# Patient Record
Sex: MALE | Race: WHITE
[De-identification: names, ages, dates, MRNs, and addresses within clinical notes are randomized per-mention and may not be internally consistent; named-entity substitution may affect disease eponyms.]

---

## 2017-02-25 ENCOUNTER — HOSPITAL ENCOUNTER (EMERGENCY)
Dept: HOSPITAL 25 - UCEAST | Age: 37
Discharge: HOME | End: 2017-02-25
Payer: SELF-PAY

## 2017-02-25 VITALS — SYSTOLIC BLOOD PRESSURE: 106 MMHG | DIASTOLIC BLOOD PRESSURE: 66 MMHG

## 2017-02-25 DIAGNOSIS — F17.290: ICD-10-CM

## 2017-02-25 DIAGNOSIS — H61.23: Primary | ICD-10-CM

## 2017-02-25 PROCEDURE — G0463 HOSPITAL OUTPT CLINIC VISIT: HCPCS

## 2017-02-25 PROCEDURE — 99213 OFFICE O/P EST LOW 20 MIN: CPT

## 2017-02-25 NOTE — UC
Ear Complaint HPI





- History of Current Complaint


Chief Complaint: UCEar


Stated Complaint: EAR COMPLAINT


Time Seen by Provider: 02/25/17 13:30


Hx Obtained From: Patient


Onset/Duration: Gradual Onset - has had decreased hearing and ringing in bilat 

ears over past few days, no pain, no drainage


Severity Initially: Mild


Severity Currently: Mild - has had chronic earwax build -up. has them flushed 

approx q month


Aggravating Factors: Nothing


Alleviating Factors: Nothing


Associated Signs/Symptoms: Positive: Hearing Loss.  Negative: URI Symptoms


Related History: Other (Noted In Comments) - cerumen impactions





- Allergies/Home Medications


Allergies/Adverse Reactions: 


 Allergies











Allergy/AdvReac Type Severity Reaction Status Date / Time


 


No Known Allergies Allergy   Verified 04/13/16 17:28











Home Medications: 


 Home Medications





NK [No Home Medications Reported]  02/25/17 [History Confirmed 02/25/17]











PMH/Surg Hx/FS Hx/Imm Hx


Previously Healthy: Yes


Cardiovascular History Of: 


   Denies: Cardiac Disorders


Respiratory History Of: 


   Denies: COPD





- Surgical History


Surgical History: Yes


Surgery Procedure, Year, and Place: WISDOM TEETH





- Family History


Known Family History: Positive: None


   Negative: Blood Disorder





- Social History


Occupation: Employed Full-time


Lives: With Family


Alcohol Use: None


Substance Use Type: Marijuana


Substance Use Comment - Amount & Last Used: daily


Smoking Status (MU): Current Some Day Smoker


Type: Cigarettes


Amount Used/How Often: 1 CIG/DAY


Cessation Counseling: Patient Advised to Stop





Review of Systems


Constitutional: Negative


Skin: Negative


ENT: Other - wax build up


Respiratory: Negative


Cardiovascular: Negative


Neurovascular: Negative


Neurological: Negative


Psychological: Negative


All Other Systems Reviewed And Are Negative: Yes





Physical Exam


Triage Information Reviewed: Yes


Appearance: Well-Appearing, No Pain Distress, Well-Nourished


Vital Signs: 


 Initial Vital Signs











Temp  98.7 F   02/25/17 13:14


 


Pulse  52   02/25/17 13:14


 


Resp  18   02/25/17 13:14


 


BP  106/66   02/25/17 13:14


 


Pulse Ox  98   02/25/17 13:14











Vital Signs Reviewed: Yes


Eyes: Positive: Conjunctiva Clear


ENT: Positive: Pharynx normal, Other: - bilateral cerumen impactions occluding 

TMs.  Negative: Nasal congestion


Respiratory Exam: Normal


Respiratory: Positive: Lungs clear


Cardiovascular Exam: Normal


Cardiovascular: Positive: RRR


Neurological Exam: Normal


Psychological Exam: Normal


Skin Exam: Normal





Re-Evaluation





- Re-Evaluation


  ** First Eval


Re-Evaluation Time: 14:10 - bilateral ear canals are clear, TM's pearly with 

normal landmarks. pt states ears feel better, hearing improved


Change: Improved





Ear Complaint Course/Dx





- Differential Dx/Diagnosis


Differential Diagnosis/HQI/PQRI: Cerumen Impaction, Otitis Externa, Otitis Media


Provider Diagnoses: bilateral cerumen impaction





Discharge





- Discharge Plan


Condition: Good


Disposition: HOME


Patient Education Materials:  Cerumen Impaction (ED)


Additional Instructions: 


use the over the counter ear wax oil you have at home as directed





return for problems

## 2017-08-04 ENCOUNTER — HOSPITAL ENCOUNTER (EMERGENCY)
Dept: HOSPITAL 25 - ED | Age: 37
Discharge: HOME | End: 2017-08-04
Payer: SELF-PAY

## 2017-08-04 VITALS — DIASTOLIC BLOOD PRESSURE: 60 MMHG | SYSTOLIC BLOOD PRESSURE: 108 MMHG

## 2017-08-04 DIAGNOSIS — Z72.0: ICD-10-CM
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
## 2017-08-04 DIAGNOSIS — F12.90: ICD-10-CM

## 2017-08-04 DIAGNOSIS — S99.912A: Primary | ICD-10-CM
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
## 2017-08-04 PROCEDURE — 99281 EMR DPT VST MAYX REQ PHY/QHP: CPT

## 2017-08-04 NOTE — RAD
Indication: Left ankle pain.



3 views of left ankle demonstrates no fracture or dislocation. No other bone or joint

abnormality is identified. Soft tissue swelling is noted.



IMPRESSION: No fracture of the left ankle is noted.

## 2017-08-04 NOTE — ED
Lower Extremity





- HPI Summary


HPI Summary: 


37M presents with left ankle pain two days ago after invert his ankle.  He has 

been walking on his toes since.  He states that area swelled and he noticed 

some brusing to the area.  He denies any numbness or tingling. He has been 

taking advil for his pain. He has sprain his ankle multiple times and feels 

like it feels the same. 








- History of Current Complaint


Chief Complaint: EDExtremityLower


Stated Complaint: FALL/LT ANKLE-FOOT PAIN


Time Seen by Provider: 08/04/17 16:04


Pain Intensity: 5





- Allergies/Home Medications


Allergies/Adverse Reactions: 


 Allergies











Allergy/AdvReac Type Severity Reaction Status Date / Time


 


No Known Allergies Allergy   Verified 08/04/17 13:53














PMH/Surg Hx/FS Hx/Imm Hx


Respiratory History: 


   Denies: Hx Chronic Obstructive Pulmonary Disease (COPD)


Psychiatric History: Reports: Hx Substance Abuse - HX OF HEROIN INJECTION IN 20' S





- Surgical History


Surgery Procedure, Year, and Place: WISDOM TEETH


Infectious Disease History: Reports: Hx Hepatitis - c


   Denies: Hx Clostridium Difficile, Hx Human Immunodeficiency Virus (HIV), Hx 

of Known/Suspected MRSA, Hx Shingles, Hx Tuberculosis, Hx Known/Suspected VRE, 

Hx Known/Suspected VRSA, History Other Infectious Disease, Traveled Outside the 

[place] in Last 30 Days





- Family History


Known Family History: Positive: None


   Negative: Cardiac Disease, Blood Disorder





- Social History


Alcohol Use: None


Substance Use Type: Reports: Marijuana


Substance Use Comment - Amount & Last Used: daily


Smoking Status (MU): Current Some Day Smoker


Type: Cigarettes


Amount Used/How Often: 1 CIG/DAY





Review of Systems


Negative: Fever


Negative: Chest Pain


Negative: Shortness Of Breath


Positive: Myalgia - left ankle


All Other Systems Reviewed And Are Negative: Yes





Physical Exam


Triage Information Reviewed: Yes


Vital Signs On Initial Exam: 


 Initial Vitals











Temp Pulse Resp BP Pulse Ox


 


 98.7 F   66   16   121/62   97 


 


 08/04/17 13:53  08/04/17 13:53  08/04/17 13:53  08/04/17 13:53  08/04/17 13:53











Vital Signs Reviewed: Yes


Appearance: Positive: Well-Appearing


Skin: Positive: Warm, Dry


Head/Face: Positive: Normal Head/Face Inspection


Eyes: Positive: Normal, Conjunctiva Clear


Respiratory/Lung Sounds: Positive: Clear to Auscultation, Breath Sounds Present


Cardiovascular: Positive: Normal, RRR


Musculoskeletal: Positive: Limited @ - left ankle, Edema Left - ankle, Other - 

good pulses, capillary refill<2 secs, ecchymosis to ankle and foot, tender 

across lateral aspect of ankle





Diagnostics





- Vital Signs


 Vital Signs











  Temp Pulse Resp BP Pulse Ox


 


 08/04/17 15:38  97.9 F  89   108/60  98


 


 08/04/17 13:53  98.7 F  66  16  121/62  97














- Laboratory


Lab Statement: Any lab studies that have been ordered have been reviewed, and 

results considered in the medical decision making process.





Lower Extremity Course/Dx





- Course


Course Of Treatment: 37M presents with left ankle pain two days ago after 

invert his ankle.  He has been walking on his toes since.  He states that area 

swelled and he noticed some brusing to the area.  He denies any numbness or 

tingling. He has been taking advil for his pain. He has sprain his ankle 

multiple times and feels like it feels the same. xray normal. will treat with 

RICE. patient understands and agrees with plan.





- Diagnoses


Differential Diagnosis/HQI/PQRI: Positive: Fracture (Closed), Sprain, Strain


Provider Diagnoses: 


 Left ankle injury








Discharge





- Discharge Plan


Condition: Good


Disposition: HOME


Patient Education Materials:  Ankle Sprain (ED)


Referrals: 


[facility] [Outside]


Additional Instructions: 


Stay off ankle as much as possible


Ice, elevate, keep in ACE


Ibuprofen every 6-8 hours for pain


Establish care with primary care physician


Return to ED if develop any new or worsening symptoms

## 2018-07-21 ENCOUNTER — HOSPITAL ENCOUNTER (EMERGENCY)
Dept: HOSPITAL 25 - ED | Age: 38
Discharge: HOME | End: 2018-07-21
Payer: SELF-PAY

## 2018-07-21 VITALS — SYSTOLIC BLOOD PRESSURE: 101 MMHG | DIASTOLIC BLOOD PRESSURE: 67 MMHG

## 2018-07-21 DIAGNOSIS — Z72.0: ICD-10-CM

## 2018-07-21 DIAGNOSIS — F19.90: ICD-10-CM

## 2018-07-21 DIAGNOSIS — F10.921: Primary | ICD-10-CM

## 2018-07-21 PROCEDURE — 99284 EMERGENCY DEPT VISIT MOD MDM: CPT

## 2018-09-20 ENCOUNTER — HOSPITAL ENCOUNTER (EMERGENCY)
Dept: HOSPITAL 25 - ED | Age: 38
LOS: 1 days | Discharge: HOME | End: 2018-09-21
Payer: SELF-PAY

## 2018-09-20 DIAGNOSIS — R45.851: Primary | ICD-10-CM

## 2018-09-20 DIAGNOSIS — Z72.0: ICD-10-CM

## 2018-09-20 LAB
BASOPHILS # BLD AUTO: 0.1 10^3/UL (ref 0–0.2)
EOSINOPHIL # BLD AUTO: 0.2 10^3/UL (ref 0–0.6)
HCT VFR BLD AUTO: 43 % (ref 42–52)
HGB BLD-MCNC: 14.9 G/DL (ref 14–18)
LYMPHOCYTES # BLD AUTO: 1.6 10^3/UL (ref 1–4.8)
MCH RBC QN AUTO: 32 PG (ref 27–31)
MCHC RBC AUTO-ENTMCNC: 34 G/DL (ref 31–36)
MCV RBC AUTO: 92 FL (ref 80–94)
MONOCYTES # BLD AUTO: 0.7 10^3/UL (ref 0–0.8)
NEUTROPHILS # BLD AUTO: 8.1 10^3/UL (ref 1.5–7.7)
NRBC # BLD AUTO: 0 10^3/UL
NRBC BLD QL AUTO: 0
PLATELET # BLD AUTO: 251 10^3/UL (ref 150–450)
RBC # BLD AUTO: 4.69 10^6/UL (ref 4–5.4)
WBC # BLD AUTO: 10.7 10^3/UL (ref 3.5–10.8)

## 2018-09-20 PROCEDURE — 99285 EMERGENCY DEPT VISIT HI MDM: CPT

## 2018-09-20 PROCEDURE — 86618 LYME DISEASE ANTIBODY: CPT

## 2018-09-20 PROCEDURE — G0480 DRUG TEST DEF 1-7 CLASSES: HCPCS

## 2018-09-20 PROCEDURE — 85025 COMPLETE CBC W/AUTO DIFF WBC: CPT

## 2018-09-20 PROCEDURE — 81003 URINALYSIS AUTO W/O SCOPE: CPT

## 2018-09-20 PROCEDURE — 36415 COLL VENOUS BLD VENIPUNCTURE: CPT

## 2018-09-20 PROCEDURE — 84443 ASSAY THYROID STIM HORMONE: CPT

## 2018-09-20 PROCEDURE — 80320 DRUG SCREEN QUANTALCOHOLS: CPT

## 2018-09-20 PROCEDURE — 80053 COMPREHEN METABOLIC PANEL: CPT

## 2018-09-20 PROCEDURE — 80307 DRUG TEST PRSMV CHEM ANLYZR: CPT

## 2018-09-20 PROCEDURE — 80329 ANALGESICS NON-OPIOID 1 OR 2: CPT

## 2018-09-20 NOTE — ED
Psychiatric Complaint





- HPI Summary


HPI Summary: 


This patient is a 38 year old M presenting to [facility] with a chief complaint 

of SI since 9/19/18. He endorses several stressors: my parents will be 

homeless in a week/are addicted to heroin, his friend hanged himself last week

, he overdosed on acid 2 months ago, and his wife left him. Pt endorses 

marijuana use. Patient denies SI, and claims that he is in the ED because 

someone who is trying to fuck my wife said that the patient claimed Id 

rather die than never see my family again. He endorses a previous suicide 

attempt when 13 (25 years ago). Per police report, pt was brought in for active 

SI.





- History Of Current Complaint


Chief Complaint: EDMentalHealth


Hx Obtained From: Patient


Onset/Duration: Sudden Onset, Lasting Hours, Resolved - allegedly


Severity Initially: Moderate


Severity Currently: Mild


Character: Frustrated


Aggravating Factor(s): Recent Stress - see HPI summary


Alleviating Factor(s): Nothing


Associated Signs And Symptoms: Positive: Negative


Related History: Positive For: Prior Psychiatric Issues


Has Suicidal: Reports: Thoughts - resolved, Has Prior Attempt(s) - 25 years ago


Has Homicidal: Denies: Thoughts





- Allergies/Home Medications


Allergies/Adverse Reactions: 


 Allergies











Allergy/AdvReac Type Severity Reaction Status Date / Time


 


No Known Allergies Allergy   Verified 08/04/17 13:53














PMH/Surg Hx/FS Hx/Imm Hx


Endocrine/Hematology History: 


   Denies: Hx Sickle Cell Disease


Cardiovascular History: 


   Denies: Hx Pacemaker/ICD


Respiratory History: 


   Denies: Hx Chronic Obstructive Pulmonary Disease (COPD)


GI History: 


   Denies: Hx Ileostomy


 History: 


   Denies: Hx Dialysis


Sensory History: 


   Denies: Hx Legally Blind, Hx Deafness


Opthamlomology History: 


   Denies: Hx Legally Blind


EENT History: 


   Denies: Hx Deafness


Neurological History: 


   Denies: Hx Dementia


Psychiatric History: Reports: Hx Suicide Attempt, Hx Substance Abuse - HX OF 

HEROIN INJECTION IN 20'S





- Surgical History


Surgery Procedure, Year, and Place: WISDOM TEETH


Infectious Disease History: Yes


Infectious Disease History: Reports: Hx Hepatitis - c


   Denies: Hx Clostridium Difficile, Hx Human Immunodeficiency Virus (HIV), Hx 

of Known/Suspected MRSA, Hx Shingles, Hx Tuberculosis, Hx Known/Suspected VRE, 

Hx Known/Suspected VRSA, History Other Infectious Disease, Traveled Outside the 

[place] in Last 30 Days





- Family History


Known Family History: 


   Negative: Cardiac Disease, Blood Disorder





- Social History


Lives: Alone


Alcohol Use: None


Hx Substance Use: Yes


Substance Use Type: Reports: Marijuana, Synthetic Drugs


Substance Use Comment - Amount & Last Used: daily


Hx Tobacco Use: Yes


Smoking Status (MU): Current Some Day Smoker


Type: Cigarettes


Amount Used/How Often: 1 CIG/DAY





Review of Systems


Negative: Fever


Positive: no symptoms reported


Positive: Other - resolved SI.


All Other Systems Reviewed And Are Negative: Yes





Physical Exam





- Summary


Physical Exam Summary: 


VITAL SIGNS: Reviewed.


GENERAL: Patient is a well-developed and nourished (MALE OR FEMALE) who is 

lying comfortable in the stretcher. Patient is not in any acute respiratory 

distress.


HEAD AND FACE: No signs of trauma. No ecchymosis, hematomas or skull 

depressions. No sinus tenderness.


EYES: PERRLA, EOMI x 2, No injected conjunctiva, no nystagmus.


EARS: Hearing grossly intact. Ear canals and tympanic membranes are within 

normal limits.


MOUTH: Oropharynx within normal limits.


NECK: Supple, trachea is midline, no adenopathy, no JVD, no carotid bruit, no c-

spine tenderness, neck with full ROM.


CHEST: Symmetric, no tenderness at palpation


LUNGS: Clear to auscultation bilaterally. No wheezing or crackles.


CVS: Regular rate and rhythm, S1 and S2 present, no murmurs or gallops 

appreciated.


ABDOMEN: Soft, non-tender. No signs of distention. No rebound no guarding, and 

no masses palpated. Bowel sounds are normal.


EXTREMITIES: FROM in all major joints, no edema, no cyanosis or clubbing.


NEURO: Alert and oriented x 3. No acute neurological deficits. Speech is normal 

and follows commands.


SKIN: Dry and warm


PSYCH: Elaine


Triage Information Reviewed: Yes


Vital Signs On Initial Exam: 


 Initial Vitals











Temp Pulse Resp BP Pulse Ox


 


 98.8 F   91   16   149/110   98 


 


 09/20/18 15:48  09/20/18 15:48  09/20/18 15:48  09/20/18 15:48  09/20/18 15:48











Vital Signs Reviewed: Yes





Diagnostics





- Vital Signs


 Vital Signs











  Temp Pulse Resp BP Pulse Ox


 


 09/20/18 15:48  98.8 F  91  16  149/110  98














- Laboratory


Lab Results: 


 Lab Results











  09/20/18 09/20/18 09/20/18 Range/Units





  16:13 16:13 16:19 


 


WBC    10.7  (3.5-10.8)  10^3/ul


 


RBC    4.69  (4.00-5.40)  10^6/ul


 


Hgb    14.9  (14.0-18.0)  g/dl


 


Hct    43  (42-52)  %


 


MCV    92  (80-94)  fL


 


MCH    32 H  (27-31)  pg


 


MCHC    34  (31-36)  g/dl


 


RDW    14  (10.5-15)  %


 


Plt Count    251  (150-450)  10^3/ul


 


MPV    9.0  (7.4-10.4)  um3


 


Neut % (Auto)    75.5  (38-83)  %


 


Lymph % (Auto)    15.3 L  (25-47)  %


 


Mono % (Auto)    6.7  (0-7)  %


 


Eos % (Auto)    1.9  (0-6)  %


 


Baso % (Auto)    0.6  (0-2)  %


 


Absolute Neuts (auto)    8.1 H  (1.5-7.7)  10^3/ul


 


Absolute Lymphs (auto)    1.6  (1.0-4.8)  10^3/ul


 


Absolute Monos (auto)    0.7  (0-0.8)  10^3/ul


 


Absolute Eos (auto)    0.2  (0-0.6)  10^3/ul


 


Absolute Basos (auto)    0.1  (0-0.2)  10^3/ul


 


Absolute Nucleated RBC    0  10^3/ul


 


Nucleated RBC %    0  


 


Sodium     (135-145)  mmol/L


 


Potassium     (3.5-5.0)  mmol/L


 


Chloride     (101-111)  mmol/L


 


Carbon Dioxide     (22-32)  mmol/L


 


Anion Gap     (2-11)  mmol/L


 


BUN     (6-24)  mg/dL


 


Creatinine     (0.67-1.17)  mg/dL


 


Est GFR ( Amer)     (>60)  


 


Est GFR (Non-Af Amer)     (>60)  


 


BUN/Creatinine Ratio     (8-20)  


 


Glucose     ()  mg/dL


 


Calcium     (8.6-10.3)  mg/dL


 


Total Bilirubin     (0.2-1.0)  mg/dL


 


AST     (13-39)  U/L


 


ALT     (7-52)  U/L


 


Alkaline Phosphatase     ()  U/L


 


Total Protein     (6.4-8.9)  g/dL


 


Albumin     (3.2-5.2)  g/dL


 


Globulin     (2-4)  g/dL


 


Albumin/Globulin Ratio     (1-3)  


 


TSH     (0.34-5.60)  mcIU/mL


 


Urine Color  Yellow    


 


Urine Appearance  Cloudy    


 


Urine pH  7.0    (5-9)  


 


Ur Specific Gravity  1.014    (1.010-1.030)  


 


Urine Protein  Negative    (Negative)  


 


Urine Ketones  Negative    (Negative)  


 


Urine Blood  Negative    (Negative)  


 


Urine Nitrate  Negative    (Negative)  


 


Urine Bilirubin  Negative    (Negative)  


 


Urine Urobilinogen  Negative    (Negative)  


 


Ur Leukocyte Esterase  Negative    (Negative)  


 


Urine Glucose  Negative    (Negative)  


 


Salicylates     (<30)  mg/dL


 


Urine Opiates Screen   None detected   (None Detect)  


 


Acetaminophen     mcg/mL


 


Ur Barbiturates Screen   None detected   (None Detect)  


 


Ur Phencyclidine Scrn   None detected   (None Detect)  


 


Ur Amphetamines Screen   None detected   (None Detect)  


 


U Benzodiazepines Scrn   None detected   (None Detect)  


 


Urine Cocaine Screen   None detected   (None Detect)  


 


U Cannabinoids Screen   Presumptive positive A   (None Detect)  


 


Serum Alcohol     (<10)  mg/dL














  09/20/18 Range/Units





  16:19 


 


WBC   (3.5-10.8)  10^3/ul


 


RBC   (4.00-5.40)  10^6/ul


 


Hgb   (14.0-18.0)  g/dl


 


Hct   (42-52)  %


 


MCV   (80-94)  fL


 


MCH   (27-31)  pg


 


MCHC   (31-36)  g/dl


 


RDW   (10.5-15)  %


 


Plt Count   (150-450)  10^3/ul


 


MPV   (7.4-10.4)  um3


 


Neut % (Auto)   (38-83)  %


 


Lymph % (Auto)   (25-47)  %


 


Mono % (Auto)   (0-7)  %


 


Eos % (Auto)   (0-6)  %


 


Baso % (Auto)   (0-2)  %


 


Absolute Neuts (auto)   (1.5-7.7)  10^3/ul


 


Absolute Lymphs (auto)   (1.0-4.8)  10^3/ul


 


Absolute Monos (auto)   (0-0.8)  10^3/ul


 


Absolute Eos (auto)   (0-0.6)  10^3/ul


 


Absolute Basos (auto)   (0-0.2)  10^3/ul


 


Absolute Nucleated RBC   10^3/ul


 


Nucleated RBC %   


 


Sodium  138  (135-145)  mmol/L


 


Potassium  4.0  (3.5-5.0)  mmol/L


 


Chloride  107  (101-111)  mmol/L


 


Carbon Dioxide  25  (22-32)  mmol/L


 


Anion Gap  6  (2-11)  mmol/L


 


BUN  16  (6-24)  mg/dL


 


Creatinine  0.88  (0.67-1.17)  mg/dL


 


Est GFR ( Amer)  116.7  (>60)  


 


Est GFR (Non-Af Amer)  96.4  (>60)  


 


BUN/Creatinine Ratio  18.2  (8-20)  


 


Glucose  109 H  ()  mg/dL


 


Calcium  9.4  (8.6-10.3)  mg/dL


 


Total Bilirubin  0.70  (0.2-1.0)  mg/dL


 


AST  19  (13-39)  U/L


 


ALT  20  (7-52)  U/L


 


Alkaline Phosphatase  60  ()  U/L


 


Total Protein  7.3  (6.4-8.9)  g/dL


 


Albumin  4.7  (3.2-5.2)  g/dL


 


Globulin  2.6  (2-4)  g/dL


 


Albumin/Globulin Ratio  1.8  (1-3)  


 


TSH  1.71  (0.34-5.60)  mcIU/mL


 


Urine Color   


 


Urine Appearance   


 


Urine pH   (5-9)  


 


Ur Specific Gravity   (1.010-1.030)  


 


Urine Protein   (Negative)  


 


Urine Ketones   (Negative)  


 


Urine Blood   (Negative)  


 


Urine Nitrate   (Negative)  


 


Urine Bilirubin   (Negative)  


 


Urine Urobilinogen   (Negative)  


 


Ur Leukocyte Esterase   (Negative)  


 


Urine Glucose   (Negative)  


 


Salicylates  < 2.50  (<30)  mg/dL


 


Urine Opiates Screen   (None Detect)  


 


Acetaminophen  < 15  mcg/mL


 


Ur Barbiturates Screen   (None Detect)  


 


Ur Phencyclidine Scrn   (None Detect)  


 


Ur Amphetamines Screen   (None Detect)  


 


U Benzodiazepines Scrn   (None Detect)  


 


Urine Cocaine Screen   (None Detect)  


 


U Cannabinoids Screen   (None Detect)  


 


Serum Alcohol  < 10  (<10)  mg/dL











Result Diagrams: 


 09/20/18 16:19





 09/20/18 16:19


Lab Statement: Any lab studies that have been ordered have been reviewed, and 

results considered in the medical decision making process.





Course/Dx





- Course


Course Of Treatment: This patient is a 38 year old M presenting to [facility] 

with a chief complaint of alleged SI since 9/19/18. He endorses several 

stressors: my parents will be homeless in a week/are addicted to heroin, his 

friend hanged himself last week, he overdosed on acid 2 months ago, and his 

wife left him. Pt endorses marijuana use. Patient denies SI, and claims that he 

is in the ED because someone who is trying to fuck my wife said that the 

patient claimed Id rather die than never see my family again. He endorses a 

previous suicide attempt when 13 (25 years ago).  In the ED course, the patient 

was given a nicotine inhaler.  The patient's labs are normal except for low MCH

, low lymph %, high abs neut, high glucose, and presumptive (+) cannabinoids.





- Differential Dx/Clinical Impression


Provider Diagnosis: 


 Suicidal ideation








Discharge





- Sign-Out/Discharge


Documenting (check all that apply): Sign-Out Patient


Signing out patient TO: Cheng Carter [facility]





- Discharge Plan


Condition: Good


Disposition: HOME


Referrals: 


No Primary Care Phys,NOPCP [Primary Care Provider] - 





- Billing Disposition and Condition


Condition: GOOD


Disposition: Home





- Attestation Statements


Document Initiated by Mathieu: Yes


Documenting Scribe: Pratik Camejo


Provider For Whom Mathieu is Documenting (Include Credential): Dr. Moshe Romero MD


Scribe Attestation: 


Pratik CASTILLO scribed for Dr. Moshe Romero MD on 09/21/18 at 0513. 


Scribe Documentation Reviewed: Yes


Provider Attestation: 


The documentation as recorded by the Pratik young accurately 

reflects the service I personally performed and the decisions made by me, Dr. Moshe Romero MD

## 2018-09-21 VITALS — DIASTOLIC BLOOD PRESSURE: 63 MMHG | SYSTOLIC BLOOD PRESSURE: 96 MMHG

## 2018-09-21 NOTE — ED
Progress





- Progress Note


Progress Note: 





This pt was signed out by Dr. Romero, pending disposition, awaiting MHE.





Pt had a mental health evaluation and his case was reviewed by Dr. Brown, 

psychiatrist. Pt will be discharged home. 





Course/Dx





- Diagnoses


Provider Diagnoses: 


 Suicidal ideation








Discharge





- Sign-Out/Discharge


Documenting (check all that apply): Patient Departure - Discharge, Receiving 

Sign-Out


Signing out patient TO: Cheng Carter


Receiving patient FROM: Moshe Romero





- Discharge Plan


Condition: Good


Disposition: HOME


Patient Education Materials:  Depression (ED), Anxiety (ED), Suicide Prevention 

(ED)


Referrals: 


[facility] [Other] (Please follow up with your 

regular therapist, Jannette Rod, at your earliest convenience.)


No Primary Care Phys,NOPCP [Primary Care Provider] - 





- Attestation Statements


Document Initiated by Scribe: Yes


Documenting Scribe: Kiera Sevilla


Provider For Whom Scribe is Documenting (Include Credential): Cheng Carter MD


Scribe Attestation: 


I, Kiera Sevilla, scribed for Cheng Carter MD on 09/21/18 at 0600.

## 2019-07-14 ENCOUNTER — HOSPITAL ENCOUNTER (EMERGENCY)
Dept: HOSPITAL 25 - ED | Age: 39
Discharge: HOME | End: 2019-07-14
Payer: SELF-PAY

## 2019-07-14 DIAGNOSIS — Z72.0: ICD-10-CM

## 2019-07-14 DIAGNOSIS — S91.111A: Primary | ICD-10-CM
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
## 2019-07-14 PROCEDURE — 99282 EMERGENCY DEPT VISIT SF MDM: CPT

## 2019-07-14 NOTE — ED
Lower Extremity





- HPI Summary


HPI Summary: 





Patient complains of laceration to base of great right toe day and a half ago.  

Patient states he was on cocaine, and stepped on florescent light bulb.  Denies 

any other pain injury or symptoms.





- History of Current Complaint


Chief Complaint: EDLacSutureRecheck


Stated Complaint: GLASS IN FOOT PER PT


Time Seen by Provider: 07/14/19 17:40


Hx Obtained From: Patient


Mechanism Of Injury: Other


Onset of Pain: Immediate


Onset/Duration: Days


Severity Initially: Moderate


Severity Currently: Moderate


Pain Intensity: 4


Pain Scale Used: 0-10 Numeric


Timing: Constant


Location: Is Discrete @


Character Of Pain: Aching, Throbbing


Associated Signs And Symptoms: Positive: Negative


Aggravating Factor(s): Standing, Weight Bearing


Alleviating Factor(s): Rest


Able to Bear Weight: Yes





- Allergies/Home Medications


Allergies/Adverse Reactions: 


 Allergies











Allergy/AdvReac Type Severity Reaction Status Date / Time


 


No Known Allergies Allergy   Verified 07/14/19 16:33














PMH/Surg Hx/FS Hx/Imm Hx


Endocrine/Hematology History: 


   Denies: Hx Sickle Cell Disease


Cardiovascular History: 


   Denies: Hx Pacemaker/ICD


Respiratory History: 


   Denies: Hx Chronic Obstructive Pulmonary Disease (COPD)


GI History: 


   Denies: Hx Ileostomy


 History: 


   Denies: Hx Dialysis


Sensory History: 


   Denies: Hx Legally Blind, Hx Deafness


Opthamlomology History: 


   Denies: Hx Legally Blind


Neurological History: 


   Denies: Hx Dementia


Psychiatric History: Reports: Hx Suicide Attempt, Hx Substance Abuse - HX OF 

HEROIN INJECTION IN 20'S





- Surgical History


Surgery Procedure, Year, and Place: WISDOM TEETH


Infectious Disease History: No


Infectious Disease History: Reports: Hx Hepatitis - c


   Denies: Hx Clostridium Difficile, Hx Human Immunodeficiency Virus (HIV), Hx 

of Known/Suspected MRSA, Hx Shingles, Hx Tuberculosis, Hx Known/Suspected VRE, 

Hx Known/Suspected VRSA, History Other Infectious Disease, Traveled Outside the 

[place] in Last 30 Days





- Family History


Known Family History: 


   Negative: Cardiac Disease, Blood Disorder





- Social History


Alcohol Use: None


Hx Substance Use: Yes


Substance Use Type: Reports: Cocaine, Marijuana, Synthetic Drugs


Substance Use Comment - Amount & Last Used: daily


Hx Tobacco Use: Yes


Smoking Status (MU): Current Some Day Smoker


Type: Cigarettes


Amount Used/How Often: 1 CIG/DAY





Review of Systems


Constitutional: Negative


Eyes: Negative


ENT: Negative


Cardiovascular: Negative


Respiratory: Negative


Gastrointestinal: Negative


Genitourinary: Negative


Musculoskeletal: Negative


Skin: Other


Neurological: Negative


Psychological: Normal


All Other Systems Reviewed And Are Negative: Yes





Physical Exam





- Summary


Physical Exam Summary: 





Already healing laceration along base of right great toe.  Right foot is 

extremely dirty.  Right foot and wound cleaned.  Superficial laceration also 

noted to right heel.  No foreign bodies noted in laceration of right great toe 

or right heel.  Wounds will not be sutured, will be left to heal by secondary 

intention.  PMS intact distally.


Triage Information Reviewed: Yes


Vital Signs On Initial Exam: 


 Initial Vitals











Temp Pulse Resp BP Pulse Ox


 


 99.0 F   86   20   117/64   97 


 


 07/14/19 16:30  07/14/19 16:30  07/14/19 16:30  07/14/19 16:30  07/14/19 16:30











Vital Signs Reviewed: Yes


Appearance: Positive: Well-Appearing


Skin: Positive: Warm


Head/Face: Positive: Normal Head/Face Inspection


Eyes: Positive: Normal


Neck: Positive: Supple


Respiratory/Lung Sounds: Positive: Clear to Auscultation


Cardiovascular: Positive: Normal


Abdomen Description: Positive: Nontender


Musculoskeletal: Positive: Normal


Neurological: Positive: Normal


Psychiatric: Positive: Normal


AVPU Assessment: Alert





- Stefan Coma Scale


Best Eye Response: 4 - Spontaneous


Best Motor Response: 6 - Obeys Commands


Best Verbal Response: 5 - Oriented


Coma Scale Total: 15





Diagnostics





- Vital Signs


 Vital Signs











  Temp Pulse Resp BP Pulse Ox


 


 07/14/19 16:30  99.0 F  86  20  117/64  97














- Laboratory


Lab Statement: Any lab studies that have been ordered have been reviewed, and 

results considered in the medical decision making process.





Lower Extremity Course/Dx





- Course


Course Of Treatment: Patient complains of laceration to base of great right toe 

day and a half ago.  Patient states he was on cocaine, and stepped on 

florescent light bulb.  Denies any other pain injury or symptoms.  Vital signs 

within normal limits.  No indication for suturing.  Patient started on 

antibiotics.  Wound wrapped.





- Diagnoses


Provider Diagnoses: 


 Laceration








Discharge





- Sign-Out/Discharge


Documenting (check all that apply): Patient Departure


Patient Received Moderate/Deep Sedation with Procedure: No





- Discharge Plan


Condition: Stable


Disposition: HOME


Prescriptions: 


Cephalexin CAP* [Keflex CAP*] 500 mg PO TID 7 Days #21 cap


Sulfamethox/Trimethoprim DS* [Bactrim /160 TAB*] 1 tab PO BID 10 Days #20 

tab


Patient Education Materials:  Laceration (ED), Laceration Without Closure (ED)


Referrals: 


No Primary Care Phys,NOPCP [Primary Care Provider] - 


Jann Stevens MD [Medical Doctor] - 


Additional Instructions: 


Take antibiotics as directed.  Keep wound clean and dry and protected.  Follow-

up with orthopedics Dr. Sanderson for further evaluation.  Return to the ED for 

any new or worsening symptoms.





- Billing Disposition and Condition


Condition: STABLE


Disposition: Home

## 2019-10-14 ENCOUNTER — HOSPITAL ENCOUNTER (EMERGENCY)
Dept: HOSPITAL 25 - ED | Age: 39
Discharge: HOME | End: 2019-10-14
Payer: COMMERCIAL

## 2019-10-14 VITALS — SYSTOLIC BLOOD PRESSURE: 110 MMHG | DIASTOLIC BLOOD PRESSURE: 74 MMHG

## 2019-10-14 DIAGNOSIS — M25.561: Primary | ICD-10-CM

## 2019-10-14 DIAGNOSIS — R21: ICD-10-CM

## 2019-10-14 DIAGNOSIS — F17.210: ICD-10-CM

## 2019-10-14 PROCEDURE — 96372 THER/PROPH/DIAG INJ SC/IM: CPT

## 2019-10-14 PROCEDURE — 99282 EMERGENCY DEPT VISIT SF MDM: CPT

## 2019-10-14 NOTE — ED
Lower Extremity





- HPI Summary


HPI Summary: 





Complains of right knee pain starting yesterday.  Patient states he was dancing 

and landed on a rock and twisted it.  States pain on right side of knee.  

Patient is ambulatory, denies any other pain, injury or symptoms.





- History of Current Complaint


Chief Complaint: EDExtremityLower


Stated Complaint: RT LEG INJURY PER PT


Time Seen by Provider: 10/14/19 18:08


Hx Obtained From: Patient


Mechanism Of Injury: Other


Onset of Pain: Immediate


Onset/Duration: Hours


Severity Initially: Severe


Severity Currently: Severe


Pain Intensity: 9


Pain Scale Used: 0-10 Numeric


Timing: Constant


Location: Is Discrete @


Character Of Pain: Aching, Throbbing


Associated Signs And Symptoms: Positive: Negative


Aggravating Factor(s): Standing, Ambulation, Movement


Alleviating Factor(s): Rest


Able to Bear Weight: Yes





- Allergies/Home Medications


Allergies/Adverse Reactions: 


 Allergies











Allergy/AdvReac Type Severity Reaction Status Date / Time


 


No Known Allergies Allergy   Verified 10/14/19 17:40














PMH/Surg Hx/FS Hx/Imm Hx


Endocrine/Hematology History: 


   Denies: Hx Anticoagulant Therapy, Hx Sickle Cell Disease


Cardiovascular History: 


   Denies: Hx Pacemaker/ICD


Respiratory History: 


   Denies: Hx Chronic Obstructive Pulmonary Disease (COPD)


GI History: 


   Denies: Hx Ileostomy


 History: 


   Denies: Hx Dialysis


Sensory History: 


   Denies: Hx Legally Blind, Hx Deafness


Opthamlomology History: 


   Denies: Hx Legally Blind


Neurological History: 


   Denies: Hx Dementia


Psychiatric History: Reports: Hx Suicide Attempt, Hx Substance Abuse - HX OF 

HEROIN INJECTION IN 20'S





- Surgical History


Surgery Procedure, Year, and Place: WISDOM TEETH


Infectious Disease History: No


Infectious Disease History: Reports: Hx Hepatitis - c


   Denies: Hx Clostridium Difficile, Hx Human Immunodeficiency Virus (HIV), Hx 

of Known/Suspected MRSA, Hx Shingles, Hx Tuberculosis, Hx Known/Suspected VRE, 

Hx Known/Suspected VRSA, History Other Infectious Disease, Traveled Outside the 

[place] in Last 30 Days





- Family History


Known Family History: 


   Negative: Cardiac Disease, Blood Disorder





- Social History


Alcohol Use: None


Hx Substance Use: Yes


Substance Use Type: Reports: Cocaine, Marijuana, Synthetic Drugs


Substance Use Comment - Amount & Last Used: daily


Hx Tobacco Use: Yes


Smoking Status (MU): Current Some Day Smoker


Type: Cigarettes


Amount Used/How Often: 1 CIG/DAY





Review of Systems


Constitutional: Negative


Eyes: Negative


ENT: Negative


Cardiovascular: Negative


Respiratory: Negative


Gastrointestinal: Negative


Genitourinary: Negative


Musculoskeletal: Other


Skin: Negative


Neurological: Negative


Psychological: Normal


All Other Systems Reviewed And Are Negative: Yes





Physical Exam





- Summary


Physical Exam Summary: 





No erythema, deformity, ecchymosis, swelling noted to right knee.  Full range 

of motion with some pain.  PMS intact distally.  Calf soft nontender.


Triage Information Reviewed: Yes


Vital Signs On Initial Exam: 


 Initial Vitals











Temp Pulse Resp BP Pulse Ox


 


 99.8 F   85   16   106/75   99 


 


 10/14/19 15:35  10/14/19 15:35  10/14/19 15:35  10/14/19 15:35  10/14/19 15:35











Vital Signs Reviewed: Yes


Appearance: Positive: Well-Appearing


Skin: Positive: Warm


Head/Face: Positive: Normal Head/Face Inspection


Eyes: Positive: Normal


Neck: Positive: Supple


Respiratory/Lung Sounds: Positive: Clear to Auscultation


Cardiovascular: Positive: Normal


Abdomen Description: Positive: Nontender


Musculoskeletal: Positive: Normal


Neurological: Positive: Normal


Psychiatric: Positive: Normal


AVPU Assessment: Alert





- Stefan Coma Scale


Best Eye Response: 4 - Spontaneous


Best Motor Response: 6 - Obeys Commands


Best Verbal Response: 5 - Oriented


Coma Scale Total: 15





Procedures





- Sedation


Patient Received Moderate/Deep Sedation with Procedure: No





Diagnostics





- Vital Signs


 Vital Signs











  Temp Pulse Resp BP Pulse Ox


 


 10/14/19 15:35  99.8 F  85  16  106/75  99














- Laboratory


Lab Statement: Any lab studies that have been ordered have been reviewed, and 

results considered in the medical decision making process.





Lower Extremity Course/Dx





- Course


Course Of Treatment: Complains of right knee pain starting yesterday.  Patient 

states he was dancing and landed on a rock and twisted it.  States pain on 

right side of knee.  Patient is ambulatory, denies any other pain, injury or 

symptoms.  Vital signs within normal limits.  X-ray negative for fracture.  

Advised patient follow up with ortho if symptoms do not improve in a couple 

days.





- Diagnoses


Provider Diagnoses: 


 Knee pain, Rash








Discharge ED





- Sign-Out/Discharge


Documenting (check all that apply): Patient Departure





- Discharge Plan


Condition: Stable


Disposition: HOME


Patient Education Materials:  Acute Rash (ED), Knee Pain (ED)


Referrals: 


Jay Ramos MD [Primary Care Provider] - 


Samantha Dobson MD [Medical Doctor] - 


Aury Fry MD [Medical Doctor] - 


Additional Instructions: 


Alternate ibuprofen 600 mg with Tylenol 650 mg every 3 hours as needed for 

right knee pain.  Follow-up with orthopedics Dr. Fry for further evaluation 

of right knee pain.  Follow-up with dermatology Dr. Dobson for further 

evaluation of rash.





- Billing Disposition and Condition


Condition: STABLE


Disposition: Home